# Patient Record
Sex: MALE | Race: WHITE | NOT HISPANIC OR LATINO | Employment: FULL TIME | URBAN - METROPOLITAN AREA
[De-identification: names, ages, dates, MRNs, and addresses within clinical notes are randomized per-mention and may not be internally consistent; named-entity substitution may affect disease eponyms.]

---

## 2019-09-24 NOTE — TELEPHONE ENCOUNTER
MEDICAL RECORDS REQUEST   [facility]  Urology Clinic  [address] [place] [address]  PHONE: [phone]  Fax: [phone]        FUTURE VISIT INFORMATION                                                   JAMIL Hernandez: 1985 scheduled for future visit at [facility]    APPOINTMENT INFORMATION:    Date: 19 1:20PM     Provider:  Kavon Starks MD     Reason for Visit/Diagnosis: Vasectomy consult     REFERRAL INFORMATION:    Referring provider:  Self    Specialty: N/A    Referring providers clinic:  N/A     Clinic contact number:  N/A     RECORDS REQUESTED FOR VISIT                                                     NOTES  STATUS/DETAILS   OFFICE NOTE from referring provider  no   OFFICE NOTE from other specialist  no   DISCHARGE SUMMARY from hospital  no   DISCHARGE REPORT from the ER  no   OPERATIVE REPORT  no   MEDICATION LIST  no     PRE-VISIT CHECKLIST      Record collection complete Yes   Appointment appropriately scheduled           (right time/right provider) Yes   MyChart activation If no, please explain: In process    Questionnaire complete If no, please explain: In process      Completed by: Crystal Baird

## 2019-10-23 ENCOUNTER — PRE VISIT (OUTPATIENT)
Dept: UROLOGY | Facility: CLINIC | Age: 34
End: 2019-10-23

## 2019-10-23 NOTE — TELEPHONE ENCOUNTER
Patient coming in for vasectomy consult with Dr. Starks. Patient chart reviewed, no need for call, all records available and ready for appointment.

## 2019-11-07 ENCOUNTER — OFFICE VISIT (OUTPATIENT)
Dept: UROLOGY | Facility: CLINIC | Age: 34
End: 2019-11-07

## 2019-11-07 ENCOUNTER — PRE VISIT (OUTPATIENT)
Dept: UROLOGY | Facility: CLINIC | Age: 34
End: 2019-11-07

## 2019-11-07 VITALS
HEIGHT: 72 IN | HEART RATE: 92 BPM | WEIGHT: 240 LBS | DIASTOLIC BLOOD PRESSURE: 94 MMHG | BODY MASS INDEX: 32.51 KG/M2 | SYSTOLIC BLOOD PRESSURE: 151 MMHG

## 2019-11-07 DIAGNOSIS — Z30.09 ENCOUNTER FOR VASECTOMY COUNSELING: Primary | ICD-10-CM

## 2019-11-07 ASSESSMENT — PATIENT HEALTH QUESTIONNAIRE - PHQ9
SUM OF ALL RESPONSES TO PHQ QUESTIONS 1-9: 0
10. IF YOU CHECKED OFF ANY PROBLEMS, HOW DIFFICULT HAVE THESE PROBLEMS MADE IT FOR YOU TO DO YOUR WORK, TAKE CARE OF THINGS AT HOME, OR GET ALONG WITH OTHER PEOPLE: NOT DIFFICULT AT ALL
SUM OF ALL RESPONSES TO PHQ QUESTIONS 1-9: 0

## 2019-11-07 ASSESSMENT — MIFFLIN-ST. JEOR: SCORE: 2066.63

## 2019-11-07 ASSESSMENT — PAIN SCALES - GENERAL: PAINLEVEL: NO PAIN (0)

## 2019-11-07 NOTE — PROGRESS NOTES
UROLOGY CLINIC NOTE  DATE: 11/07/19    CC: Desires sterilization, self-referred    HPI: Raffi Hinojosa is a 34 year old male with no significant PMH who presents to clinic for consideration of vasectomy for sterilization. He previously saw [facility] and says he was told he had anatomy (? Large vas) that may lead to a prolonged recovery/post-op restrictions. He would like a 2nd opinion with Dr. Starks.    Partner:  for 10 years  Children: Two children, 1 year old girl and 4 year old boy  Previous  surgery or trauma: He denies  Chronic pain history: He denies     PMH:   No chronic medical problems   Premature infant  Fractured fingers  Denies problems with local anesthetic (such as at the dentist office)    FAMILY HX:   Father just recently diagnosed with MS.  No history of coagulopathies, no  malignancies.     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    REVIEW OF SYMPTOMS:   Answers for HPI/ROS submitted by the patient on 11/7/2019   If you checked off any problems, how difficult have these problems made it for you to do your work, take care of things at home, or get along with other people?: Not difficult at all  PHQ9 TOTAL SCORE: 0  General Symptoms: No  Skin Symptoms: No  HENT Symptoms: No  EYE SYMPTOMS: No  HEART SYMPTOMS: No  LUNG SYMPTOMS: No  INTESTINAL SYMPTOMS: No  URINARY SYMPTOMS: No  REPRODUCTIVE SYMPTOMS: No  SKELETAL SYMPTOMS: No  BLOOD SYMPTOMS: No  NERVOUS SYSTEM SYMPTOMS: No  MENTAL HEALTH SYMPTOMS: No    GENERAL PHYSICAL EXAM:   BP (!) 151/94   Pulse 92   Ht 1.829 m (6')   Wt 108.9 kg (240 lb)   BMI 32.55 kg/m        Constitutional: No acute distress. Well nourished.   PSYCH: Normal mood and affect.   NEURO: Normal gait, no focal deficits.   EYES: Anicteric, EOMI, PERR  CARDIOPULMONARY: Breathing non-labored, pulse regular, no peripheral edema.   GI: Abdomen soft, non-distended, non-tender  MUSCULOSKELETAL: Normal limb proportions, no muscle wasting, no contractures.    SKIN: Normal virilized hair distribution, no lesions, warts or rashes over genitalia, abdomen extremities or face.   HEME/LYMPH: No echymosis, no lymphadenopathy in groin, no lymphedema.     EXAM:  Phallus normal , meatus adequate, no plaques palpated.   Testes descended, consistency is normal. No intra-testicular masses.  Epididymes present.  Vas present bilateraly, both very easy to find.  DORIS deferred.     ASSESSMENT/PLAN:   I discussed with him at length the risks and benefits of the procedure. He understands that this is a sterilization procedure, and not reversible contraception. He understands that reversals, while possible, are not guaranteed to work and fairly complex. I discussed with him the option of sperm cryopreservation.     I stressed that he continues to be fertile in the post-operative period, and that he should continue using other contraceptive methods, such as a condom, until he obtains a semen analysis and we review the results to confirm success of the procedure and infertility. I also stressed to him that recanalization and pregnancy can occur in about 1 per thousand cases, possibly more even after we clear him with a semen analysis showing no motile sperm. I counseled him on the dorene-operative risks of bleeding, infection, pain.  I described to him post-vasectomy pain syndrome that can occur in about 1 to 2% of men undergoing vasectomy.     We also discussed recovery times (typically days if no complications) and post-operative care including use of ice packs, pain medication and wound care.    He will think about the above and  schedule the procedure if he decides to proceed    Ari Jurado MD   Urology, PGY-2        I saw and examined the patient with the resident today.  I agree with the resident note and plan of care as above.     Kavon Starks MD  Urology Staff

## 2019-11-07 NOTE — PATIENT INSTRUCTIONS
Schedule vasectomy.  Please complete prep.      Lashawn Red LPN    It was a pleasure meeting with you today.  Thank you for allowing me and my team the privilege of caring for you today.  YOU are the reason we are here, and I truly hope we provided you with the excellent service you deserve.  Please let us know if there is anything else we can do for you so that we can be sure you are leaving completely satisfied with your care experience.

## 2019-12-16 ENCOUNTER — PRE VISIT (OUTPATIENT)
Dept: UROLOGY | Facility: CLINIC | Age: 34
End: 2019-12-16

## 2019-12-16 NOTE — TELEPHONE ENCOUNTER
Patient coming in for vasectomy procedure.    Unable to reach patient to discuss vasectomy prep which includes: stopping all blood thinners for seven days prior to procedure, eating a meal prior to procedure, and shaving the hair from the base of the penis and scrotum the night prior to procedure.    LVM for patient to call clinic back to discuss above prep.    Lashawn Red LPN  12/16/19  11:29 AM

## 2019-12-16 NOTE — TELEPHONE ENCOUNTER
Patient coming in for vasectomy procedure.    Contacted patient to discuss vasectomy prep which includes: stopping all blood thinners for seven days prior to procedure, eating a meal prior to procedure, and shaving the hair from the base of the penis and scrotum the night prior to procedure.    Patient stated understanding.    Lashawn Red LPN  12/16/19  2:03 PM

## 2019-12-18 ENCOUNTER — TELEPHONE (OUTPATIENT)
Dept: UROLOGY | Facility: CLINIC | Age: 34
End: 2019-12-18

## 2019-12-18 NOTE — TELEPHONE ENCOUNTER
Patient notified not to take any blood thinners one week prior to his vasectomy appointment next week with Dr. Kavon Starks. Patient agreed with the plan.      Shaan Leroy MA

## 2019-12-18 NOTE — TELEPHONE ENCOUNTER
[facility] Call Center    Phone Message    May a detailed message be left on voicemail: yes    Reason for Call: pt has a couple of questions regarding the ibuprofen. He wants to make sure everything is a go for next week. Please call pt to discuss.    Action Taken: Message routed to:  [facility] urology

## 2019-12-26 ENCOUNTER — OFFICE VISIT (OUTPATIENT)
Dept: UROLOGY | Facility: CLINIC | Age: 34
End: 2019-12-26

## 2019-12-26 VITALS
SYSTOLIC BLOOD PRESSURE: 161 MMHG | DIASTOLIC BLOOD PRESSURE: 98 MMHG | WEIGHT: 238 LBS | BODY MASS INDEX: 32.23 KG/M2 | HEIGHT: 72 IN | HEART RATE: 102 BPM

## 2019-12-26 DIAGNOSIS — Z30.2 ENCOUNTER FOR VASECTOMY: Primary | ICD-10-CM

## 2019-12-26 RX ORDER — LIDOCAINE HYDROCHLORIDE 20 MG/ML
20 INJECTION, SOLUTION EPIDURAL; INFILTRATION; INTRACAUDAL; PERINEURAL ONCE
Status: COMPLETED | OUTPATIENT
Start: 2019-12-26 | End: 2019-12-26

## 2019-12-26 RX ADMIN — LIDOCAINE HYDROCHLORIDE 20 MG: 20 INJECTION, SOLUTION EPIDURAL; INFILTRATION; INTRACAUDAL; PERINEURAL at 11:57

## 2019-12-26 ASSESSMENT — MIFFLIN-ST. JEOR: SCORE: 2057.56

## 2019-12-26 ASSESSMENT — PAIN SCALES - GENERAL
PAINLEVEL: NO PAIN (0)
PAINLEVEL: NO PAIN (0)

## 2019-12-26 NOTE — PROGRESS NOTES
Procedure: Vasectomy bilateral.   Anesthesia: Local lidocaine injection by surgeon.  Surgeon: ESPERANZA Starks M.D.   Assistant:  Mila Zarate RN present    Description of procedure: After the patient received education material regarding vasectomy, including risks and benefits, we proceeded with obtaining consent and proceeded with the surgery. He understands that there is a risk of late failure from recanalization. He understands that he is fertile until he has completed one or more semen analyses and he is given clearance from us for unprotected intercourse.  He understands that we will contact regarding the results but it is his responsibility to make sure he is cleared before having unprotected intercourse.  I have discussed with him other risks including bleeding, infection, acute and possible long-term pain.    The right vas was ligated first.  This was done using the standard technique by grasping it with a three-finger  and lifting it up to the skin.  A small amount of lidocaine was infiltrated into the skin and vasal sheath.  The skin was punctured and dilated bluntly using the scalpel-less dissector.  The sheath was identified and a rigid clamp was passed around the vas which was then lifted out of the incision.  The vas was cleaned off from the deferential vessels and the sheath, and cautery was used to divide the vas between mosquitos.  A small segment was removed and discarded.  The lumen of the vas was cannulated to confirm its identity, and the lumens of both ends were cauterized.  Pen cautery was used sparingly/as needed for minor bleeders.  A facial interposition was then performed with a single suture of 4-0 chromic. The skin defect was closed with a 4-0 chromic interrupted suture after confirming adequate hemostasis.       We then turned our attention to the left side and a similar technique was performed. This involved division, excision of a segment, cautery of the lumens, and fascial  interposition.    There were no hematomas noted at the end of the procedure.  The patient tolerated the procedure well.    He was advised to return for a post vasectomy semen check in 3 months, and that he is not sterile and must continue to use contraception until we tell him otherwise (based on follow-up semen specimen(s)).    Plan:  -Post vasectomy semen analysis in 3 months   -ice packs to scrotum X 24h  -keep incision dry X 48h  -Rx for  Tylenol #3    Lexie PAULSON

## 2019-12-26 NOTE — LETTER
12/26/2019       RE: Raffi Hinojosa  [address]  [place] [address]     Dear Colleague,    Thank you for referring your patient, Raffi Hinojosa, to the [facility] AND [facility] at [facility]. Please see a copy of my visit note below.    Chief Complaint   Patient presents with     RECHECK     vasectomy procedure.       Regina Brown MA      The following medication was given:     MEDICATION:  Lidocaine   ROUTE: MD dose  SITE: MD dose  DOSE: 20ml  LOT #: [id number]  : knowNormalsenMesuro kabi  EXPIRATION DATE: 04/23  NDC#: 7282171094   Was there drug waste? Yes  Amount of drug waste (mL): 10.  Reason for waste:  As per MD Regina Brown [facility]  December 26, 2019      Procedure: Vasectomy bilateral.   Anesthesia: Local lidocaine injection by surgeon.  Surgeon: ESPERANZA Starks M.D.   Assistant:  Mila Zarate RN present    Description of procedure: After the patient received education material regarding vasectomy, including risks and benefits, we proceeded with obtaining consent and proceeded with the surgery. He understands that there is a risk of late failure from recanalization. He understands that he is fertile until he has completed one or more semen analyses and he is given clearance from us for unprotected intercourse.  He understands that we will contact regarding the results but it is his responsibility to make sure he is cleared before having unprotected intercourse.  I have discussed with him other risks including bleeding, infection, acute and possible long-term pain.    The right vas was ligated first.  This was done using the standard technique by grasping it with a three-finger  and lifting it up to the skin.  A small amount of lidocaine was infiltrated into the skin and vasal sheath.  The skin was punctured and dilated bluntly using the scalpel-less dissector.  The sheath was identified and a rigid clamp was passed around  the vas which was then lifted out of the incision.  The vas was cleaned off from the deferential vessels and the sheath, and cautery was used to divide the vas between mosquitos.  A small segment was removed and discarded.  The lumen of the vas was cannulated to confirm its identity, and the lumens of both ends were cauterized.  Pen cautery was used sparingly/as needed for minor bleeders.  A facial interposition was then performed with a single suture of 4-0 chromic. The skin defect was closed with a 4-0 chromic interrupted suture after confirming adequate hemostasis.       We then turned our attention to the left side and a similar technique was performed. This involved division, excision of a segment, cautery of the lumens, and fascial interposition.    There were no hematomas noted at the end of the procedure.  The patient tolerated the procedure well.    He was advised to return for a post vasectomy semen check in 3 months, and that he is not sterile and must continue to use contraception until we tell him otherwise (based on follow-up semen specimen(s)).    Plan:  -Post vasectomy semen analysis in 3 months   -ice packs to scrotum X 24h  -keep incision dry X 48h  -Rx for  Tylenol #3    Lexie PAULSON

## 2019-12-26 NOTE — PATIENT INSTRUCTIONS
What Can I Expect After My Vasectomy?       Your scrotum may be swollen and bruised. We suggest you:  - Raise the area and apply ice packs (or frozen vegetables). Use the ice packs for 20 minutes on and 20 minutes off for 24 to 36 hours.  - Wear a jock strap or tight briefs for support for a week.  - Limit your activity for the first 24 to 36 hours. Wait up to 48 hours, if you feel the need. No heavy lifting for 1 week.       You will be offered a prescription for Tylenol 3 (acetaminophen with codeine). You may take ibuprofen with this medication, but do not double up with acetaminophen while taking Tylenol 3.       You should be able to return to work the next day, unless you do heavy physical work.       You can safely ejaculate (have a sexual climax) in about one week, or when it feels comfortable.     Risk of pregnancy     After your vasectomy, you can still get your partner pregnant for three months or more. Use extra birth control, such as condoms, until tests show that you are sterile (no live sperm).     Vasectomy is not 100 percent reliable. Pregnancy may occur for about 1 out of 2000 men even after testing shows zero sperm count.     Can a vasectomy be reversed?     Vasectomy is meant to be birth control that lasts a lifetime. If you change your mind, we can try to reverse it with surgery. But this will not be successful in every case.     Sperm count test     You will have a sperm-count test after the vasectomy. You should have had at least 20 ejaculations (discharges of semen) since your surgery. It will be a few months before you are sterile. Ten to twelve weeks after your surgery, schedule a sperm count test. Call [phone] to schedule. We will call you in 2 weeks with the results.     If you have any questions, please contact us:     [facility]rology and [facility] (nurse line): [phone]

## 2019-12-26 NOTE — PROGRESS NOTES
Chief Complaint   Patient presents with     RECHECK     vasectomy procedure.       Regina Brown MA      The following medication was given:     MEDICATION:  Lidocaine   ROUTE: MD dose  SITE: MD dose  DOSE: 20ml  LOT #: [id number]  : [employer]  EXPIRATION DATE: 04/23  NDC#: [id number]   Was there drug waste? Yes  Amount of drug waste (mL): 10.  Reason for waste:  As per MD Regina Brown, [facility] [address]  December 26, 2019

## 2019-12-26 NOTE — NURSING NOTE
Invasive Procedure Safety Checklist:    Procedure: bilateral vasectomy    Action: Complete sections and checkboxes as appropriate.    Pre-procedure:  1. Patient ID Verified with 2 identifiers (Mary and  or MRN) : YES    2. Procedure and site verified with patient/designee (when able) : YES    3. Accurate consent documentation in medical record : YES    4. H&P (or appropriate assessment) documented in medical record : NO  H&P must be up to 30 days prior to procedure an updated within 24 hours of                 Procedure as applicable.     5. Relevant diagnostic and radiology test results appropriately labeled and displayed as applicable : NO    6. Blood products, implants, devices, and/or special equipment available for the procedure as applicable : NO    7. Procedure site(s) marked with provider initials [Exclusions: n/a] : NO    8. Marking not required. Reason : Yes  Procedure does not require site marking    Time Out:     Time-Out performed immediately prior to starting procedure, including verbal and active participation of all team members addressing: YES    1. Correct patient identity.  2. Confirmed that the correct side and site are marked.  3. An accurate procedure to be done.  4. Agreement on the procedure to be done.  5. Correct patient position.  6. Relevant images and results are properly labeled and appropriately displayed.  7. The need to administer antibiotics or fluids for irrigation purposes during the procedure as applicable.  8. Safety precautions based on patient history or medication use.    During Procedure: Verification of correct person, site, and procedure occurs any time the responsibility for care of the patient is transferred to another member of the care team.    Mila Zarate RN-BC, BSN  Urology Care Coordinator

## 2020-06-15 DIAGNOSIS — Z30.2 ENCOUNTER FOR VASECTOMY: ICD-10-CM

## 2020-06-15 PROCEDURE — 89321 SEMEN ANAL SPERM DETECTION: CPT

## 2020-06-15 NOTE — LETTER
June 17, 2020      Raffi Hinojosa  [address]  [place] [address]      Dear Raffi,     We are writing to inform you of your test results.    You are cleared for intercourse post-vasectomy.     Your semen analysis showed zero sperm.         Resulted Orders   Post-Vasectomy Analysis (ISA)   Result Value Ref Range    Collection Method Masturbation     Collection Site ISA     Specimen Type Semen     Lab Receipt Time 02:05 PM     Time of Analysis 02:20 PM     Analysis Temp - Centigrade 23 centigrade    Abstinence days 8 (A) 2 - 7 days    Liquefied Yes yes/no    Viscous No yes/no    Agglutination N/A yes/no    pH 7.6 7.2 pH    Volume 5.3 1.5 ml    Concentration 0 15 million/ml      Comment:      No sperm seen in 10 uL aliquot taken from specimen concentrated after centrifugation.     Total Number 0 39 million      Comment:      No sperm seen in 10 uL aliquot taken from specimen concentrated after centrifugation.     Progressive motility 0 32 %      Comment:      No sperm seen in 10 uL aliquot taken from specimen concentrated after centrifugation.     Non-progressive motility 0 %    Immotile 0 %    Total Progressive Motile 0 15.6 million      Comment:      No sperm seen in 10 uL aliquot taken from specimen concentrated after centrifugation.     Round Cells 0 2 million/ml    WBC . %    Immature Sperm . %    Cell Fragments . %    Consent to Release to Partner Yes        If you have any questions or concerns, please call the clinic at the number listed above.       Sincerely,        [facility]

## 2020-06-16 LAB
ABSTINENCE DAYS: 8 DAYS (ref 2–7)
AGGLUTINATION: ABNORMAL YES/NO
ANALYSIS TEMP - CENTIGRADE: 23 CENTIGRADE
CELL FRAGMENTS: ABNORMAL %
COLLECTION METHOD: ABNORMAL
COLLECTION SITE: ABNORMAL
CONSENT TO RELEASE TO PARTNER: YES
IMMATURE SPERM: ABNORMAL %
IMMOTILE: 0 %
LAB RECEIPT TIME: ABNORMAL
LIQUEFIED: YES YES/NO
NON-PROGRESSIVE MOTILITY: 0 %
PROGRESSIVE MOTILITY: 0 % (ref 32–?)
ROUND CELLS: 0 MILLION/ML (ref ?–2)
SPECIMEN CONCENTRATION: 0 MILLION/ML (ref 15–?)
SPECIMEN PH: 7.6 PH (ref 7.2–?)
SPECIMEN TYPE: ABNORMAL
SPECIMEN VOL UR: 5.3 ML (ref 1.5–?)
TIME OF ANALYSIS: ABNORMAL
TOTAL NUMBER: 0 MILLION (ref 39–?)
TOTAL PROGRESSIVE MOTILE: 0 MILLION (ref 15.6–?)
VISCOUS: NO YES/NO
WBC SPECIMEN: ABNORMAL %

## 2020-06-16 NOTE — RESULT ENCOUNTER NOTE
Dear Raffi,     You are cleared for intercourse post-vasectomy.    Your semen analysis showed zero sperm.    Thank You!   Let me know if you have any questions.    Lexie PAULSON

## 2024-11-02 SDOH — HEALTH STABILITY: PHYSICAL HEALTH: ON AVERAGE, HOW MANY DAYS PER WEEK DO YOU ENGAGE IN MODERATE TO STRENUOUS EXERCISE (LIKE A BRISK WALK)?: 2 DAYS

## 2024-11-02 SDOH — HEALTH STABILITY: PHYSICAL HEALTH: ON AVERAGE, HOW MANY MINUTES DO YOU ENGAGE IN EXERCISE AT THIS LEVEL?: 60 MIN

## 2024-11-02 ASSESSMENT — SOCIAL DETERMINANTS OF HEALTH (SDOH): HOW OFTEN DO YOU GET TOGETHER WITH FRIENDS OR RELATIVES?: ONCE A WEEK

## 2024-11-07 ENCOUNTER — OFFICE VISIT (OUTPATIENT)
Dept: FAMILY MEDICINE | Facility: OTHER | Age: 39
End: 2024-11-07
Payer: COMMERCIAL

## 2024-11-07 VITALS
TEMPERATURE: 98.2 F | DIASTOLIC BLOOD PRESSURE: 88 MMHG | HEIGHT: 72 IN | WEIGHT: 256 LBS | BODY MASS INDEX: 34.67 KG/M2 | HEART RATE: 94 BPM | OXYGEN SATURATION: 97 % | SYSTOLIC BLOOD PRESSURE: 138 MMHG

## 2024-11-07 DIAGNOSIS — F41.9 ANXIETY: ICD-10-CM

## 2024-11-07 DIAGNOSIS — Z83.3 FAMILY HISTORY OF DIABETES MELLITUS: ICD-10-CM

## 2024-11-07 DIAGNOSIS — R03.0 ELEVATED BP WITHOUT DIAGNOSIS OF HYPERTENSION: ICD-10-CM

## 2024-11-07 DIAGNOSIS — Z00.00 ROUTINE GENERAL MEDICAL EXAMINATION AT A HEALTH CARE FACILITY: Primary | ICD-10-CM

## 2024-11-07 DIAGNOSIS — R63.5 WEIGHT GAIN: ICD-10-CM

## 2024-11-07 DIAGNOSIS — H61.23 BILATERAL IMPACTED CERUMEN: ICD-10-CM

## 2024-11-07 DIAGNOSIS — Z13.220 LIPID SCREENING: ICD-10-CM

## 2024-11-07 LAB
ANION GAP SERPL CALCULATED.3IONS-SCNC: 10 MMOL/L (ref 7–15)
BUN SERPL-MCNC: 15.4 MG/DL (ref 6–20)
CALCIUM SERPL-MCNC: 9.5 MG/DL (ref 8.8–10.4)
CHLORIDE SERPL-SCNC: 104 MMOL/L (ref 98–107)
CHOLEST SERPL-MCNC: 154 MG/DL
CREAT SERPL-MCNC: 0.94 MG/DL (ref 0.67–1.17)
EGFRCR SERPLBLD CKD-EPI 2021: >90 ML/MIN/1.73M2
EST. AVERAGE GLUCOSE BLD GHB EST-MCNC: 100 MG/DL
FASTING STATUS PATIENT QL REPORTED: NO
FASTING STATUS PATIENT QL REPORTED: NO
GLUCOSE SERPL-MCNC: 85 MG/DL (ref 70–99)
HBA1C MFR BLD: 5.1 % (ref 0–5.6)
HCO3 SERPL-SCNC: 24 MMOL/L (ref 22–29)
HDLC SERPL-MCNC: 44 MG/DL
LDLC SERPL CALC-MCNC: 90 MG/DL
NONHDLC SERPL-MCNC: 110 MG/DL
POTASSIUM SERPL-SCNC: 4.1 MMOL/L (ref 3.4–5.3)
SODIUM SERPL-SCNC: 138 MMOL/L (ref 135–145)
TRIGL SERPL-MCNC: 100 MG/DL
TSH SERPL DL<=0.005 MIU/L-ACNC: 0.6 UIU/ML (ref 0.3–4.2)

## 2024-11-07 PROCEDURE — 99214 OFFICE O/P EST MOD 30 MIN: CPT | Mod: 25

## 2024-11-07 PROCEDURE — 90715 TDAP VACCINE 7 YRS/> IM: CPT

## 2024-11-07 PROCEDURE — 80061 LIPID PANEL: CPT

## 2024-11-07 PROCEDURE — 90471 IMMUNIZATION ADMIN: CPT

## 2024-11-07 PROCEDURE — 80048 BASIC METABOLIC PNL TOTAL CA: CPT

## 2024-11-07 PROCEDURE — 99385 PREV VISIT NEW AGE 18-39: CPT | Mod: 25

## 2024-11-07 PROCEDURE — 83036 HEMOGLOBIN GLYCOSYLATED A1C: CPT

## 2024-11-07 PROCEDURE — 36415 COLL VENOUS BLD VENIPUNCTURE: CPT

## 2024-11-07 PROCEDURE — 84443 ASSAY THYROID STIM HORMONE: CPT

## 2024-11-07 ASSESSMENT — PAIN SCALES - GENERAL: PAINLEVEL_OUTOF10: NO PAIN (0)

## 2024-11-07 ASSESSMENT — PATIENT HEALTH QUESTIONNAIRE - PHQ9
10. IF YOU CHECKED OFF ANY PROBLEMS, HOW DIFFICULT HAVE THESE PROBLEMS MADE IT FOR YOU TO DO YOUR WORK, TAKE CARE OF THINGS AT HOME, OR GET ALONG WITH OTHER PEOPLE: SOMEWHAT DIFFICULT
SUM OF ALL RESPONSES TO PHQ QUESTIONS 1-9: 3
SUM OF ALL RESPONSES TO PHQ QUESTIONS 1-9: 3

## 2024-11-07 NOTE — PATIENT INSTRUCTIONS
Patient Education   Preventive Care Advice   This is general advice given by our system to help you stay healthy. However, your care team may have specific advice just for you. Please talk to your care team about your preventive care needs.  Nutrition  Eat 5 or more servings of fruits and vegetables each day.  Try wheat bread, brown rice and whole grain pasta (instead of white bread, rice, and pasta).  Get enough calcium and vitamin D. Check the label on foods and aim for 100% of the RDA (recommended daily allowance).  Lifestyle  Exercise at least 150 minutes each week  (30 minutes a day, 5 days a week).  Do muscle strengthening activities 2 days a week. These help control your weight and prevent disease.  No smoking.  Wear sunscreen to prevent skin cancer.  Have a dental exam and cleaning every 6 months.  Yearly exams  See your health care team every year to talk about:  Any changes in your health.  Any medicines your care team has prescribed.  Preventive care, family planning, and ways to prevent chronic diseases.  Shots (vaccines)   HPV shots (up to age 26), if you've never had them before.  Hepatitis B shots (up to age 59), if you've never had them before.  COVID-19 shot: Get this shot when it's due.  Flu shot: Get a flu shot every year.  Tetanus shot: Get a tetanus shot every 10 years.  Pneumococcal, hepatitis A, and RSV shots: Ask your care team if you need these based on your risk.  Shingles shot (for age 50 and up)  General health tests  Diabetes screening:  Starting at age 35, Get screened for diabetes at least every 3 years.  If you are younger than age 35, ask your care team if you should be screened for diabetes.  Cholesterol test: At age 39, start having a cholesterol test every 5 years, or more often if advised.  Bone density scan (DEXA): At age 50, ask your care team if you should have this scan for osteoporosis (brittle bones).  Hepatitis C: Get tested at least once in your life.  STIs (sexually  transmitted infections)  Before age 24: Ask your care team if you should be screened for STIs.  After age 24: Get screened for STIs if you're at risk. You are at risk for STIs (including HIV) if:  You are sexually active with more than one person.  You don't use condoms every time.  You or a partner was diagnosed with a sexually transmitted infection.  If you are at risk for HIV, ask about PrEP medicine to prevent HIV.  Get tested for HIV at least once in your life, whether you are at risk for HIV or not.  Cancer screening tests  Cervical cancer screening: If you have a cervix, begin getting regular cervical cancer screening tests starting at age 21.  Breast cancer scan (mammogram): If you've ever had breasts, begin having regular mammograms starting at age 40. This is a scan to check for breast cancer.  Colon cancer screening: It is important to start screening for colon cancer at age 45.  Have a colonoscopy test every 10 years (or more often if you're at risk) Or, ask your provider about stool tests like a FIT test every year or Cologuard test every 3 years.  To learn more about your testing options, visit:   .  For help making a decision, visit:   https://bit.ly/vz27056.  Prostate cancer screening test: If you have a prostate, ask your care team if a prostate cancer screening test (PSA) at age 55 is right for you.  Lung cancer screening: If you are a current or former smoker ages 50 to 80, ask your care team if ongoing lung cancer screenings are right for you.  For informational purposes only. Not to replace the advice of your health care provider. Copyright   2023 [facility]. All rights reserved. Clinically reviewed by the [facility]. Kaiam 362861 - REV 01/24.  Learning About Stress  What is stress?     Stress is your body's response to a hard situation. Your body can have a physical, emotional, or mental response. Stress is a fact of life for most people, and it  affects everyone differently. What causes stress for you may not be stressful for someone else.  A lot of things can cause stress. You may feel stress when you go on a job interview, take a test, or run a race. This kind of short-term stress is normal and even useful. It can help you if you need to work hard or react quickly. For example, stress can help you finish an important job on time.  Long-term stress is caused by ongoing stressful situations or events. Examples of long-term stress include long-term health problems, ongoing problems at work, or conflicts in your family. Long-term stress can harm your health.  How does stress affect your health?  When you are stressed, your body responds as though you are in danger. It makes hormones that speed up your heart, make you breathe faster, and give you a burst of energy. This is called the fight-or-flight stress response. If the stress is over quickly, your body goes back to normal and no harm is done.  But if stress happens too often or lasts too long, it can have bad effects. Long-term stress can make you more likely to get sick, and it can make symptoms of some diseases worse. If you tense up when you are stressed, you may develop neck, shoulder, or low back pain. Stress is linked to high blood pressure and heart disease.  Stress also harms your emotional health. It can make you flannery, tense, or depressed. Your relationships may suffer, and you may not do well at work or school.  What can you do to manage stress?  You can try these things to help manage stress:   Do something active. Exercise or activity can help reduce stress. Walking is a great way to get started. Even everyday activities such as housecleaning or yard work can help.  Try yoga or jacobo chi. These techniques combine exercise and meditation. You may need some training at first to learn them.  Do something you enjoy. For example, listen to music or go to a movie. Practice your hobby or do volunteer  "work.  Meditate. This can help you relax, because you are not worrying about what happened before or what may happen in the future.  Do guided imagery. Imagine yourself in any setting that helps you feel calm. You can use online videos, books, or a teacher to guide you.  Do breathing exercises. For example:  From a standing position, bend forward from the waist with your knees slightly bent. Let your arms dangle close to the floor.  Breathe in slowly and deeply as you return to a standing position. Roll up slowly and lift your head last.  Hold your breath for just a few seconds in the standing position.  Breathe out slowly and bend forward from the waist.  Let your feelings out. Talk, laugh, cry, and express anger when you need to. Talking with supportive friends or family, a counselor, or a roberta leader about your feelings is a healthy way to relieve stress. Avoid discussing your feelings with people who make you feel worse.  Write. It may help to write about things that are bothering you. This helps you find out how much stress you feel and what is causing it. When you know this, you can find better ways to cope.  What can you do to prevent stress?  You might try some of these things to help prevent stress:  Manage your time. This helps you find time to do the things you want and need to do.  Get enough sleep. Your body recovers from the stresses of the day while you are sleeping.  Get support. Your family, friends, and community can make a difference in how you experience stress.  Limit your news feed. Avoid or limit time on social media or news that may make you feel stressed.  Do something active. Exercise or activity can help reduce stress. Walking is a great way to get started.  Where can you learn more?  Go to https://www.YinYangMap.net/patiented  Enter N032 in the search box to learn more about \"Learning About Stress.\"  Current as of: October 24, 2023  Content Version: 14.2 2024 [employer]. "   Care instructions adapted under license by your healthcare professional. If you have questions about a medical condition or this instruction, always ask your healthcare professional. [employer] disclaims any warranty or liability for your use of this information.

## 2024-11-07 NOTE — PROGRESS NOTES
Preventive Care Visit  [facility]  Ruben DelonteabrahanDO, Family Practice  Nov 7, 2024      Assessment & Plan     Routine general medical examination at a health care facility    Anxiety  Will start zoloft 25 mg then 50 mg. Check BMP and TSH today to rule out other causes of increased anxiety.  - sertraline (ZOLOFT) 50 MG tablet; Take 0.5 tablets (25 mg) by mouth daily for 7 days, THEN 1 tablet (50 mg) daily.  - Basic metabolic panel  (Ca, Cl, CO2, Creat, Gluc, K, Na, BUN); Future  - Basic metabolic panel  (Ca, Cl, CO2, Creat, Gluc, K, Na, BUN)    Elevated BP without diagnosis of hypertension  - Basic metabolic panel  (Ca, Cl, CO2, Creat, Gluc, K, Na, BUN); Future  - Basic metabolic panel  (Ca, Cl, CO2, Creat, Gluc, K, Na, BUN)    Weight gain  - TSH with free T4 reflex; Future  - Hemoglobin A1c; Future  - Lipid panel reflex to direct LDL Fasting; Future  - TSH with free T4 reflex  - Hemoglobin A1c  - Lipid panel reflex to direct LDL Fasting    Bilateral impacted cerumen  - TX REMOVAL IMPACTED CERUMEN IRRIGATION/LVG UNILAT    Family history of diabetes mellitus  - Hemoglobin A1c; Future  - Hemoglobin A1c    Lipid screening  - Lipid panel reflex to direct LDL Fasting; Future  - Lipid panel reflex to direct LDL Fasting    Nicotine/Tobacco Cessation  He reports that he has been smoking cigarettes. He quit smokeless tobacco use about 20 months ago.    Nicotine/Tobacco Cessation Plan  Self help information given to patient      BMI  Estimated body mass index is 34.72 kg/m  as calculated from the following:    Height as of this encounter: 1.829 m (6').    Weight as of this encounter: 116.1 kg (256 lb).   Weight management plan: Discussed healthy diet and exercise guidelines    Counseling  Appropriate preventive services were addressed with this patient via screening, questionnaire, or discussion as appropriate for fall prevention, nutrition, physical activity, Tobacco-use cessation, social engagement,  weight loss and cognition.  Checklist reviewing preventive services available has been given to the patient.  Reviewed patient's diet, addressing concerns and/or questions.   He is at risk for lack of exercise and has been provided with information to increase physical activity for the benefit of his well-being.   The patient was instructed to see the dentist every 6 months.       Follow-up for med check in 4-6 weeks.    Ivis Nugent is a 39 year old, presenting for the following:  Physical        11/7/2024     7:45 AM   Additional Questions   Roomed by DAVON MAYEN    The patient reports he has had increased stress especially with work and feels that he has been experiencing more anxiety.    Health Care Directive  Patient does not have a Health Care Directive: Deferred.      11/2/2024   General Health   How would you rate your overall physical health? Good   Feel stress (tense, anxious, or unable to sleep) Very much      (!) STRESS CONCERN      11/2/2024   Nutrition   Three or more servings of calcium each day? Yes   Diet: Regular (no restrictions)   How many servings of fruit and vegetables per day? (!) 0-1   How many sweetened beverages each day? (!) 2            11/2/2024   Exercise   Days per week of moderate/strenous exercise 2 days   Average minutes spent exercising at this level 60 min      (!) EXERCISE CONCERN      11/2/2024   Social Factors   Frequency of gathering with friends or relatives Once a week   Worry food won't last until get money to buy more No   Food not last or not have enough money for food? No   Do you have housing? (Housing is defined as stable permanent housing and does not include staying ouside in a car, in a tent, in an abandoned building, in an overnight shelter, or couch-surfing.) Yes   Are you worried about losing your housing? No   Lack of transportation? No   Unable to get utilities (heat,electricity)? No            11/2/2024   Dental   Dentist two times every year?  (!) NO            11/2/2024   TB Screening   Were you born outside of the [place]? No          Today's PHQ-9 Score:       11/7/2024     7:31 AM   PHQ-9 SCORE   PHQ-9 Total Score MyChart 3 (Minimal depression)   PHQ-9 Total Score 3        Patient-reported         11/2/2024   Substance Use   Alcohol more than 3/day or more than 7/wk No   Do you use any other substances recreationally? No        Social History     Tobacco Use    Smoking status: Some Days     Current packs/day: 0.00     Types: Cigarettes    Smokeless tobacco: Former     Quit date: 3/1/2023   Substance Use Topics    Alcohol use: Yes    Drug use: Never             11/2/2024   One time HIV Screening   Previous HIV test? No          11/2/2024   STI Screening   New sexual partner(s) since last STI/HIV test? No           Reviewed and updated as needed this visit by Provider                       Objective    Exam  /88 (BP Location: Left arm, Patient Position: Sitting, Cuff Size: Adult Large)   Pulse 94   Temp 98.2  F (36.8  C) (Temporal)   Ht 1.829 m (6')   Wt 116.1 kg (256 lb)   SpO2 97%   BMI 34.72 kg/m     Estimated body mass index is 34.72 kg/m  as calculated from the following:    Height as of this encounter: 1.829 m (6').    Weight as of this encounter: 116.1 kg (256 lb).    Physical Exam  Constitutional:       General: He is not in acute distress.     Appearance: Normal appearance. He is not ill-appearing.   HENT:      Right Ear: There is impacted cerumen.      Left Ear: There is impacted cerumen.   Cardiovascular:      Rate and Rhythm: Normal rate and regular rhythm.      Heart sounds: No murmur heard.  Pulmonary:      Effort: Pulmonary effort is normal. No respiratory distress.      Breath sounds: Normal breath sounds. No wheezing.   Abdominal:      General: Abdomen is flat.      Palpations: Abdomen is soft.      Tenderness: There is no abdominal tenderness.   Neurological:      General: No focal deficit present.      Mental Status: He is  alert and oriented to person, place, and time.   Psychiatric:         Mood and Affect: Mood normal.         Behavior: Behavior normal.           Signed Electronically by: Ruben Lenz DO    Answers submitted by the patient for this visit:  Patient Health Questionnaire (Submitted on 11/7/2024)  If you checked off any problems, how difficult have these problems made it for you to do your work, take care of things at home, or get along with other people?: Somewhat difficult  PHQ9 TOTAL SCORE: 3

## 2024-12-09 ASSESSMENT — ANXIETY QUESTIONNAIRES
7. FEELING AFRAID AS IF SOMETHING AWFUL MIGHT HAPPEN: NOT AT ALL
7. FEELING AFRAID AS IF SOMETHING AWFUL MIGHT HAPPEN: NOT AT ALL
GAD7 TOTAL SCORE: 4
4. TROUBLE RELAXING: NOT AT ALL
3. WORRYING TOO MUCH ABOUT DIFFERENT THINGS: SEVERAL DAYS
GAD7 TOTAL SCORE: 4
1. FEELING NERVOUS, ANXIOUS, OR ON EDGE: SEVERAL DAYS
GAD7 TOTAL SCORE: 4
IF YOU CHECKED OFF ANY PROBLEMS ON THIS QUESTIONNAIRE, HOW DIFFICULT HAVE THESE PROBLEMS MADE IT FOR YOU TO DO YOUR WORK, TAKE CARE OF THINGS AT HOME, OR GET ALONG WITH OTHER PEOPLE: NOT DIFFICULT AT ALL
6. BECOMING EASILY ANNOYED OR IRRITABLE: SEVERAL DAYS
8. IF YOU CHECKED OFF ANY PROBLEMS, HOW DIFFICULT HAVE THESE MADE IT FOR YOU TO DO YOUR WORK, TAKE CARE OF THINGS AT HOME, OR GET ALONG WITH OTHER PEOPLE?: NOT DIFFICULT AT ALL
2. NOT BEING ABLE TO STOP OR CONTROL WORRYING: SEVERAL DAYS
5. BEING SO RESTLESS THAT IT IS HARD TO SIT STILL: NOT AT ALL

## 2024-12-09 ASSESSMENT — PATIENT HEALTH QUESTIONNAIRE - PHQ9
SUM OF ALL RESPONSES TO PHQ QUESTIONS 1-9: 3
SUM OF ALL RESPONSES TO PHQ QUESTIONS 1-9: 3
10. IF YOU CHECKED OFF ANY PROBLEMS, HOW DIFFICULT HAVE THESE PROBLEMS MADE IT FOR YOU TO DO YOUR WORK, TAKE CARE OF THINGS AT HOME, OR GET ALONG WITH OTHER PEOPLE: SOMEWHAT DIFFICULT

## 2024-12-10 ENCOUNTER — VIRTUAL VISIT (OUTPATIENT)
Dept: FAMILY MEDICINE | Facility: OTHER | Age: 39
End: 2024-12-10
Payer: COMMERCIAL

## 2024-12-10 DIAGNOSIS — F41.9 ANXIETY: Primary | ICD-10-CM

## 2024-12-10 PROCEDURE — 99441 PR PHYSICIAN TELEPHONE EVALUATION 5-10 MIN: CPT | Mod: 93

## 2024-12-10 NOTE — PROGRESS NOTES
Raffi is a 39 year old who is being evaluated via a billable telephone visit.    What phone number would you like to be contacted at? [phone]   How would you like to obtain your AVS? [place]  Originating Location (pt. Location): Home    Distant Location (provider location):  Off-site    Assessment & Plan     Anxiety  Patient reports improved mood and decreased anxiety. Will continue sertraline 50 mg at this time. He does not some increased somnolence, will have him take zoloft in the evening instead of the morning. Consider increasing dose as appropriate.  - sertraline (ZOLOFT) 50 MG tablet; Take 1 tablet (50 mg) by mouth daily.      Follow-up witin 3 months for in-person visit. Recheck BP and mood check, sertraline f/u.       Subjective   Raffi is a 39 year old, presenting for the following health issues:   Follow Up        12/10/2024     7:57 AM   Additional Questions   Roomed by Sue   Accompanied by N/A     History of Present Illness       Mental Health Follow-up:  Patient presents to follow-up on Anxiety.    Patient's anxiety since last visit has been:  Better  The patient is having other symptoms associated with anxiety.  Any significant life events: job concerns and financial concerns  Patient is not feeling anxious or having panic attacks.  Patient has no concerns about alcohol or drug use.    He eats 0-1 servings of fruits and vegetables daily.He consumes 1 sweetened beverage(s) daily.He exercises with enough effort to increase his heart rate 10 to 19 minutes per day.  He exercises with enough effort to increase his heart rate 3 or less days per week.   He is taking medications regularly.         11/7/2019    12:51 PM 11/7/2024     7:31 AM 12/9/2024     8:47 PM   PHQ   PHQ-9 Total Score 0 3  3    Q9: Thoughts of better off dead/self-harm past 2 weeks Not at all Not at all  Not at all        Patient-reported         12/9/2024     8:49 PM   JOHNNIE-7 SCORE   Total Score 4 (minimal anxiety)   Total Score 4         Patient-reported           Objective           Vitals:  No vitals were obtained today due to virtual visit.    Physical Exam   General: Alert and no distress //Respiratory: No audible wheeze, cough, or shortness of breath // Psychiatric:  Appropriate affect, tone, and pace of words        Phone call duration: 3 minutes  Signed Electronically by: Ruben Lenz DO

## 2025-02-11 ENCOUNTER — MYC MEDICAL ADVICE (OUTPATIENT)
Dept: FAMILY MEDICINE | Facility: OTHER | Age: 40
End: 2025-02-11
Payer: COMMERCIAL

## 2025-02-11 NOTE — LETTER
To whom it may concern:    I recently saw Raffi Micaela in our clinic. I do believe he may benefit from having a sleep study completed given his current symptoms and his history. Let us know if you have any questions or need further clarification.    Ruben Lenz, DO

## 2025-02-13 DIAGNOSIS — R40.0 DAYTIME SOMNOLENCE: Primary | ICD-10-CM

## 2025-06-07 DIAGNOSIS — F41.9 ANXIETY: ICD-10-CM

## 2025-06-10 PROBLEM — F41.9 ANXIETY: Status: ACTIVE | Noted: 2024-03-01

## 2025-06-10 PROBLEM — F41.9 ANXIETY: Chronic | Status: ACTIVE | Noted: 2024-03-01

## (undated) RX ORDER — LIDOCAINE HYDROCHLORIDE 20 MG/ML
INJECTION, SOLUTION INFILTRATION; PERINEURAL
Status: DISPENSED
Start: 2019-12-26